# Patient Record
Sex: FEMALE | Race: WHITE | NOT HISPANIC OR LATINO | Employment: OTHER | URBAN - METROPOLITAN AREA
[De-identification: names, ages, dates, MRNs, and addresses within clinical notes are randomized per-mention and may not be internally consistent; named-entity substitution may affect disease eponyms.]

---

## 2023-04-27 ENCOUNTER — APPOINTMENT (OUTPATIENT)
Dept: GENERAL RADIOLOGY | Facility: CLINIC | Age: 43
End: 2023-04-27
Attending: FAMILY MEDICINE
Payer: COMMERCIAL

## 2023-04-27 ENCOUNTER — APPOINTMENT (OUTPATIENT)
Dept: CT IMAGING | Facility: CLINIC | Age: 43
End: 2023-04-27
Attending: FAMILY MEDICINE
Payer: COMMERCIAL

## 2023-04-27 ENCOUNTER — HOSPITAL ENCOUNTER (EMERGENCY)
Facility: CLINIC | Age: 43
Discharge: HOME | End: 2023-04-27
Attending: FAMILY MEDICINE | Admitting: FAMILY MEDICINE
Payer: COMMERCIAL

## 2023-04-27 VITALS
TEMPERATURE: 97.9 F | RESPIRATION RATE: 13 BRPM | OXYGEN SATURATION: 97 % | HEART RATE: 69 BPM | DIASTOLIC BLOOD PRESSURE: 81 MMHG | SYSTOLIC BLOOD PRESSURE: 121 MMHG

## 2023-04-27 DIAGNOSIS — R93.1 ABNORMAL CT SCAN OF HEART: ICD-10-CM

## 2023-04-27 DIAGNOSIS — R07.9 RIGHT-SIDED CHEST PAIN: ICD-10-CM

## 2023-04-27 DIAGNOSIS — J18.9 PNEUMONIA OF RIGHT LOWER LOBE DUE TO INFECTIOUS ORGANISM: ICD-10-CM

## 2023-04-27 LAB
ALBUMIN SERPL BCG-MCNC: 3.6 G/DL (ref 3.5–5.2)
ALP SERPL-CCNC: 85 U/L (ref 35–104)
ALT SERPL W P-5'-P-CCNC: 15 U/L (ref 10–35)
ANION GAP SERPL CALCULATED.3IONS-SCNC: 10 MMOL/L (ref 7–15)
APTT PPP: 29 SECONDS (ref 22–38)
AST SERPL W P-5'-P-CCNC: 14 U/L (ref 10–35)
BASOPHILS # BLD AUTO: 0.1 10E3/UL (ref 0–0.2)
BASOPHILS NFR BLD AUTO: 1 %
BILIRUB SERPL-MCNC: 0.2 MG/DL
BUN SERPL-MCNC: 6.5 MG/DL (ref 6–20)
CALCIUM SERPL-MCNC: 9.9 MG/DL (ref 8.6–10)
CHLORIDE SERPL-SCNC: 100 MMOL/L (ref 98–107)
CREAT SERPL-MCNC: 0.61 MG/DL (ref 0.51–0.95)
D DIMER PPP FEU-MCNC: 2.03 UG/ML FEU (ref 0–0.5)
DEPRECATED HCO3 PLAS-SCNC: 28 MMOL/L (ref 22–29)
EOSINOPHIL # BLD AUTO: 0.3 10E3/UL (ref 0–0.7)
EOSINOPHIL NFR BLD AUTO: 2 %
ERYTHROCYTE [DISTWIDTH] IN BLOOD BY AUTOMATED COUNT: 14.9 % (ref 10–15)
GFR SERPL CREATININE-BSD FRML MDRD: >90 ML/MIN/1.73M2
GLUCOSE SERPL-MCNC: 89 MG/DL (ref 70–99)
HCT VFR BLD AUTO: 39.6 % (ref 35–47)
HGB BLD-MCNC: 12.6 G/DL (ref 11.7–15.7)
HOLD SPECIMEN: NORMAL
IMM GRANULOCYTES # BLD: 0.1 10E3/UL
IMM GRANULOCYTES NFR BLD: 1 %
INR PPP: 0.99 (ref 0.85–1.15)
LYMPHOCYTES # BLD AUTO: 4.9 10E3/UL (ref 0.8–5.3)
LYMPHOCYTES NFR BLD AUTO: 30 %
MCH RBC QN AUTO: 28.6 PG (ref 26.5–33)
MCHC RBC AUTO-ENTMCNC: 31.8 G/DL (ref 31.5–36.5)
MCV RBC AUTO: 90 FL (ref 78–100)
MONOCYTES # BLD AUTO: 1.2 10E3/UL (ref 0–1.3)
MONOCYTES NFR BLD AUTO: 7 %
NEUTROPHILS # BLD AUTO: 9.7 10E3/UL (ref 1.6–8.3)
NEUTROPHILS NFR BLD AUTO: 59 %
NRBC # BLD AUTO: 0 10E3/UL
NRBC BLD AUTO-RTO: 0 /100
PLATELET # BLD AUTO: 587 10E3/UL (ref 150–450)
POTASSIUM SERPL-SCNC: 4.1 MMOL/L (ref 3.4–5.3)
PROT SERPL-MCNC: 7.2 G/DL (ref 6.4–8.3)
RBC # BLD AUTO: 4.4 10E6/UL (ref 3.8–5.2)
SODIUM SERPL-SCNC: 138 MMOL/L (ref 136–145)
TROPONIN T SERPL HS-MCNC: <6 NG/L
WBC # BLD AUTO: 16.3 10E3/UL (ref 4–11)

## 2023-04-27 PROCEDURE — 99285 EMERGENCY DEPT VISIT HI MDM: CPT | Mod: 25 | Performed by: FAMILY MEDICINE

## 2023-04-27 PROCEDURE — 250N000013 HC RX MED GY IP 250 OP 250 PS 637: Performed by: FAMILY MEDICINE

## 2023-04-27 PROCEDURE — 85025 COMPLETE CBC W/AUTO DIFF WBC: CPT | Performed by: FAMILY MEDICINE

## 2023-04-27 PROCEDURE — 99284 EMERGENCY DEPT VISIT MOD MDM: CPT | Mod: 25 | Performed by: FAMILY MEDICINE

## 2023-04-27 PROCEDURE — 71045 X-RAY EXAM CHEST 1 VIEW: CPT

## 2023-04-27 PROCEDURE — 93005 ELECTROCARDIOGRAM TRACING: CPT | Performed by: FAMILY MEDICINE

## 2023-04-27 PROCEDURE — 258N000003 HC RX IP 258 OP 636: Performed by: FAMILY MEDICINE

## 2023-04-27 PROCEDURE — 84484 ASSAY OF TROPONIN QUANT: CPT | Performed by: FAMILY MEDICINE

## 2023-04-27 PROCEDURE — 250N000011 HC RX IP 250 OP 636: Performed by: FAMILY MEDICINE

## 2023-04-27 PROCEDURE — 85730 THROMBOPLASTIN TIME PARTIAL: CPT | Performed by: FAMILY MEDICINE

## 2023-04-27 PROCEDURE — 36415 COLL VENOUS BLD VENIPUNCTURE: CPT | Performed by: FAMILY MEDICINE

## 2023-04-27 PROCEDURE — 250N000009 HC RX 250: Performed by: FAMILY MEDICINE

## 2023-04-27 PROCEDURE — 85610 PROTHROMBIN TIME: CPT | Performed by: FAMILY MEDICINE

## 2023-04-27 PROCEDURE — 96376 TX/PRO/DX INJ SAME DRUG ADON: CPT | Performed by: FAMILY MEDICINE

## 2023-04-27 PROCEDURE — 96374 THER/PROPH/DIAG INJ IV PUSH: CPT | Mod: 59 | Performed by: FAMILY MEDICINE

## 2023-04-27 PROCEDURE — 96361 HYDRATE IV INFUSION ADD-ON: CPT | Performed by: FAMILY MEDICINE

## 2023-04-27 PROCEDURE — 85379 FIBRIN DEGRADATION QUANT: CPT | Performed by: FAMILY MEDICINE

## 2023-04-27 PROCEDURE — 96375 TX/PRO/DX INJ NEW DRUG ADDON: CPT | Performed by: FAMILY MEDICINE

## 2023-04-27 PROCEDURE — 93010 ELECTROCARDIOGRAM REPORT: CPT | Performed by: FAMILY MEDICINE

## 2023-04-27 PROCEDURE — 71275 CT ANGIOGRAPHY CHEST: CPT

## 2023-04-27 PROCEDURE — 80053 COMPREHEN METABOLIC PANEL: CPT | Performed by: FAMILY MEDICINE

## 2023-04-27 RX ORDER — OXYCODONE HYDROCHLORIDE 5 MG/1
10 TABLET ORAL ONCE
Status: COMPLETED | OUTPATIENT
Start: 2023-04-27 | End: 2023-04-27

## 2023-04-27 RX ORDER — AZITHROMYCIN 250 MG/1
TABLET, FILM COATED ORAL
Qty: 6 TABLET | Refills: 0 | Status: SHIPPED | OUTPATIENT
Start: 2023-04-27

## 2023-04-27 RX ORDER — OXYCODONE HYDROCHLORIDE 5 MG/1
5 TABLET ORAL EVERY 6 HOURS PRN
Qty: 6 TABLET | Refills: 0 | Status: SHIPPED | OUTPATIENT
Start: 2023-04-27

## 2023-04-27 RX ORDER — HYDROMORPHONE HYDROCHLORIDE 1 MG/ML
0.5 INJECTION, SOLUTION INTRAMUSCULAR; INTRAVENOUS; SUBCUTANEOUS ONCE
Status: COMPLETED | OUTPATIENT
Start: 2023-04-27 | End: 2023-04-27

## 2023-04-27 RX ORDER — IOPAMIDOL 755 MG/ML
500 INJECTION, SOLUTION INTRAVASCULAR ONCE
Status: COMPLETED | OUTPATIENT
Start: 2023-04-27 | End: 2023-04-27

## 2023-04-27 RX ORDER — HYDROMORPHONE HYDROCHLORIDE 1 MG/ML
0.5 INJECTION, SOLUTION INTRAMUSCULAR; INTRAVENOUS; SUBCUTANEOUS
Status: COMPLETED | OUTPATIENT
Start: 2023-04-27 | End: 2023-04-27

## 2023-04-27 RX ORDER — KETOROLAC TROMETHAMINE 30 MG/ML
30 INJECTION, SOLUTION INTRAMUSCULAR; INTRAVENOUS ONCE
Status: COMPLETED | OUTPATIENT
Start: 2023-04-27 | End: 2023-04-27

## 2023-04-27 RX ADMIN — OXYCODONE HYDROCHLORIDE 10 MG: 5 TABLET ORAL at 23:00

## 2023-04-27 RX ADMIN — SODIUM CHLORIDE 70 ML: 9 INJECTION, SOLUTION INTRAVENOUS at 21:12

## 2023-04-27 RX ADMIN — IOPAMIDOL 85 ML: 755 INJECTION, SOLUTION INTRAVENOUS at 21:12

## 2023-04-27 RX ADMIN — SODIUM CHLORIDE 1000 ML: 9 INJECTION, SOLUTION INTRAVENOUS at 20:27

## 2023-04-27 RX ADMIN — KETOROLAC TROMETHAMINE 30 MG: 30 INJECTION, SOLUTION INTRAMUSCULAR at 20:24

## 2023-04-27 RX ADMIN — HYDROMORPHONE HYDROCHLORIDE 0.5 MG: 1 INJECTION, SOLUTION INTRAMUSCULAR; INTRAVENOUS; SUBCUTANEOUS at 21:52

## 2023-04-27 RX ADMIN — HYDROMORPHONE HYDROCHLORIDE 0.5 MG: 1 INJECTION, SOLUTION INTRAMUSCULAR; INTRAVENOUS; SUBCUTANEOUS at 20:24

## 2023-04-27 ASSESSMENT — ACTIVITIES OF DAILY LIVING (ADL)
ADLS_ACUITY_SCORE: 35
ADLS_ACUITY_SCORE: 35

## 2023-04-28 NOTE — ED TRIAGE NOTES
Pt presents with shortness of breath a\nd right sided chest pain. Pt states she was being treated for pneumonia on right side. Pt tachpnic  and appears in distress. Color gray.    Triage Assessment     Row Name 04/27/23 1956       Triage Assessment (Adult)    Airway WDL WDL       Respiratory WDL    Respiratory WDL X  Severe shortness of breath.       Skin Circulation/Temperature WDL    Skin Circulation/Temperature WDL X  Pale       Cardiac WDL    Cardiac WDL X;chest pain  Left side       Peripheral/Neurovascular WDL    Peripheral Neurovascular WDL WDL       Cognitive/Neuro/Behavioral WDL    Cognitive/Neuro/Behavioral WDL WDL

## 2023-04-28 NOTE — ED PROVIDER NOTES
[facility] ED Provider Note   Patient: Adam Figueroa  MRN #:  [id number]  Date of Visit: April 27, 2023    CC:     Chief Complaint   Patient presents with     Shortness of Breath     HPI:  Adam Figueroa is a 42 year old female with past medical history of reactive airway disease who presented to the emergency department with recent diagnosis of right-sided pneumonia, presenting with right-sided lateral chest pain, worse with deep breathing.  Patient states that she was at [facility], and diagnosed with pneumonia.  She completed 7 days of doxycycline.  She is experiencing pain that is exacerbated by deep breathing.  She denies any recent fever or chills although she had low-grade fever at the onset of her illness 7 to 10 days ago.  She has not had any nausea, vomiting, calf pain, ankle swelling.  No prior history of DVT or PE.  Patient quit smoking last June.  She has a [id number] history.  Patient typically goes to [facility] but states that she waited 10 hours last time and her daughter insisted that she come here.    Problem List:  There are no problems to display for this patient.      Past medical history: Depression, abnormal uterine bleeding, degenerative disc disease, kidney stone, obesity, seasonal affective disorder, anxiety, fibromyalgia, history of tobacco abuse    MEDS: azithromycin (ZITHROMAX) 250 MG tablet  oxyCODONE (ROXICODONE) 5 MG tablet        ALLERGIES:    Allergies   Allergen Reactions     Latex        Past surgical history: Tubal ligation, anterior discectomy, hemorrhoidectomy, carpal tunnel release           Review of Systems   Except as noted in HPI, all other systems were reviewed and are negative    Physical Exam     Vitals were reviewed  Patient Vitals for the past 12 hrs:   BP Temp Temp src Pulse Resp SpO2   04/27/23 2300 121/81 -- -- 69 13 97 %   04/27/23 2245 (!) 107/91 -- -- 72 16  95 %   04/27/23 2230 126/78 -- -- 58 22 91 %   04/27/23 2215 124/70 -- -- 57 12 95 %   04/27/23 2200 126/74 -- -- 67 26 94 %   04/27/23 2145 127/77 -- -- 63 15 97 %   04/27/23 2100 129/64 -- -- 71 11 95 %   04/27/23 2045 (!) 140/75 -- -- 98 (!) 43 98 %   04/27/23 2030 (!) 128/92 -- -- 75 13 95 %   04/27/23 2015 115/80 -- -- 65 25 97 %   04/27/23 2000 (!) 130/110 -- -- 82 27 97 %   04/27/23 1953 (!) 147/108 97.9  F (36.6  C) Oral 88 28 96 %     GENERAL APPEARANCE: Alert and oriented x3, severe distress due to pain  FACE: normal facies  EYES: Pupils are equal  HENT: normal external exam  NECK: no adenopathy or asymmetry  RESP: normal respiratory effort; clear breath sounds bilaterally  CV: regular rate and rhythm; no significant murmurs, gallops or rubs  ABD: soft, no tenderness; no rebound or guarding; bowel sounds are normal  MS: no gross deformities noted; normal muscle tone.  EXT: No calf tenderness or pitting edema  SKIN: no worrisome rash  NEURO: no facial droop; no focal deficits, speech is normal  PSYCH: normal mood and affect      Available Lab/Imaging Results     Results for orders placed or performed during the hospital encounter of 04/27/23 (from the past 24 hour(s))   [place] Draw    Narrative    The following orders were created for panel order [place] Draw.  Procedure                               Abnormality         Status                     ---------                               -----------         ------                     Extra Blue Top Tube[[id number]]                              Final result               Extra Green Top (Lithium...[[id number]]                      Final result               Extra Purple Top Tube[[id number]]                            Final result               Extra Green Top (Lithium...[[id number]]                      Final result                 Please view results for these tests on the individual orders.   Extra Blue Top Tube   Result Value Ref Range    Hold Specimen JI     Extra Green Top (Lithium Heparin) Tube   Result Value Ref Range    Hold Specimen JIC    Extra Purple Top Tube   Result Value Ref Range    Hold Specimen JIC    Extra Green Top (Lithium Heparin) ON ICE   Result Value Ref Range    Hold Specimen k    CBC with platelets differential    Narrative    The following orders were created for panel order CBC with platelets differential.  Procedure                               Abnormality         Status                     ---------                               -----------         ------                     CBC with platelets and d...[[id number]]  Abnormal            Final result                 Please view results for these tests on the individual orders.   Comprehensive metabolic panel   Result Value Ref Range    Sodium 138 136 - 145 mmol/L    Potassium 4.1 3.4 - 5.3 mmol/L    Chloride 100 98 - 107 mmol/L    Carbon Dioxide (CO2) 28 22 - 29 mmol/L    Anion Gap 10 7 - 15 mmol/L    Urea Nitrogen 6.5 6.0 - 20.0 mg/dL    Creatinine 0.61 0.51 - 0.95 mg/dL    Calcium 9.9 8.6 - 10.0 mg/dL    Glucose 89 70 - 99 mg/dL    Alkaline Phosphatase 85 35 - 104 U/L    AST 14 10 - 35 U/L    ALT 15 10 - 35 U/L    Protein Total 7.2 6.4 - 8.3 g/dL    Albumin 3.6 3.5 - 5.2 g/dL    Bilirubin Total 0.2 <=1.2 mg/dL    GFR Estimate >90 >60 mL/min/1.73m2   Troponin T, High Sensitivity   Result Value Ref Range    Troponin T, High Sensitivity <6 <=14 ng/L   INR   Result Value Ref Range    INR 0.99 0.85 - 1.15   Partial thromboplastin time   Result Value Ref Range    aPTT 29 22 - 38 Seconds   D dimer quantitative   Result Value Ref Range    D-Dimer Quantitative 2.03 (H) 0.00 - 0.50 ug/mL FEU    Narrative    This D-dimer assay is intended for use in conjunction with a clinical pretest probability assessment model to exclude pulmonary embolism (PE) and deep venous thrombosis (DVT) in outpatients suspected of PE or DVT. The cut-off value is 0.50 ug/mL FEU.   CBC with platelets and differential   Result  Value Ref Range    WBC Count 16.3 (H) 4.0 - 11.0 10e3/uL    RBC Count 4.40 3.80 - 5.20 10e6/uL    Hemoglobin 12.6 11.7 - 15.7 g/dL    Hematocrit 39.6 35.0 - 47.0 %    MCV 90 78 - 100 fL    MCH 28.6 26.5 - 33.0 pg    MCHC 31.8 31.5 - 36.5 g/dL    RDW 14.9 10.0 - 15.0 %    Platelet Count 587 (H) 150 - 450 10e3/uL    % Neutrophils 59 %    % Lymphocytes 30 %    % Monocytes 7 %    % Eosinophils 2 %    % Basophils 1 %    % Immature Granulocytes 1 %    NRBCs per 100 WBC 0 <1 /100    Absolute Neutrophils 9.7 (H) 1.6 - 8.3 10e3/uL    Absolute Lymphocytes 4.9 0.8 - 5.3 10e3/uL    Absolute Monocytes 1.2 0.0 - 1.3 10e3/uL    Absolute Eosinophils 0.3 0.0 - 0.7 10e3/uL    Absolute Basophils 0.1 0.0 - 0.2 10e3/uL    Absolute Immature Granulocytes 0.1 <=0.4 10e3/uL    Absolute NRBCs 0.0 10e3/uL   XR Chest Port 1 View    Narrative    EXAM: XR CHEST PORT 1 VIEW  LOCATION: [facility]  DATE/TIME: 4/27/2023 8:14 PM CDT    INDICATION: Chest pain, shortness of breath  COMPARISON: None.      Impression    IMPRESSION: Low lung volumes. Heart size prominent on this AP view. Pulmonary vascularity normal. Subsegmental atelectasis right lower lung field and probable small right pleural fluid. The left lung is clear. Lower cervical surgical changes.   CT Chest Pulmonary Embolism w Contrast    Narrative    EXAM: CT CHEST PULMONARY EMBOLISM W CONTRAST  LOCATION: [facility]  DATE/TIME: 4/27/2023 9:23 PM CDT    INDICATION: Severe right pleuritic chest pain.  Recent multifocal pneumonia; elevated D dimer.  Negative PE study 8 days ago.  COMPARISON: None.  TECHNIQUE: CT chest pulmonary angiogram during arterial phase injection of IV contrast. Multiplanar reformats and MIP reconstructions were performed. Dose reduction techniques were used.   CONTRAST: Isovue 370, 85 mL    FINDINGS:  ANGIOGRAM CHEST: Pulmonary arteries are normal caliber and negative for pulmonary emboli. Thoracic aorta is  negative for dissection.    LUNGS AND PLEURA: Alveolar groundglass opacity is present in the right lung relatively sparing the medial segment right middle lobe. Bands of atelectasis are present along the major fissure and near the costal pleura in the bases. Trace right pleural   effusion.    MEDIASTINUM: Dilated left ventricle with thinning of the left ventricular apex. There is contrast mixing artifact towards the apex of the left ventricle and in addition low-attenuation along the endocardium at the apex suspect for intraventricular   thrombus. No pericardial effusion. Decompressed esophagus. No lymphadenopathy. Imaged thyroid gland is normal.    CORONARY ARTERY CALCIFICATION: None.    UPPER ABDOMEN: Benign cyst arising from the upper pole left kidney does not require specific workup or follow-up.    MUSCULOSKELETAL: Normal.      Impression    IMPRESSION:    1.  No acute pulmonary embolism.  2.  Morphologically abnormal left ventricle with dilated apex, mixing artifact and possible left ventricular apical thrombus. Findings are suspect for a cardiomyopathy. Correlation with echocardiography or cardiac MRI is suggested.  3.  Patchy alveolar groundglass attenuation in the right lung consistent with inflammation and discrete bands of atelectasis towards the right base as well as minimal left pleural fluid.       EKG reviewed by me: Normal sinus rhythm with heart rate of 70.  Normal MA, QT and QRS intervals.  Poor R wave progression with possible anterior infarct, age undetermined.  No acute ST-T wave changes.  No previous EKGs for comparison.         Impression     Final diagnoses:   Right-sided chest pain   Pneumonia of right lower lobe due to infectious organism   Abnormal CT scan of heart         ED Course & Medical Decision Making   Adam Figueroa is a 42 year old female who presented to the emergency department with severe right-sided chest pain, with pleuritic quality.  Patient was seen at triage, and  room due to her pain, and poor color.  Patient is a patient through [facility], and was at [facility] on April 19 with diagnosis of pneumonia, and subsequently treated with doxycycline for 7 days.  Patient has not smoked for about a year, and prior to that she had smoked for about 20 years.  She presents to the ED with several days of severe right-sided chest pain, with pleuritic quality.  She has not had any fevers but pain was severe.  Her daughter did not want to bring her back to [facility] and instead brought her here.  Vital signs reveal a blood pressure of 147/108, heart rate of 88, respiratory rate of 28 with 96% oxygen saturation.  Exam reveals a 42-year-old female in severe distress due to pain.  Her lungs were clear.  No rash over the right thoracic region.  No calf pain or ankle swelling.  Heart sounds were normal.  Laboratory work-up reveals a white blood count of 16.3, with 59% neutrophils, hemoglobin of 12.6.  Comprehensive metabolic panel was normal.  Troponin is less than 6, and D-dimer is 2.03.  I reviewed the patient's [facility] note.  She had a CT scan with contrast and there was no evidence for PE on April 19.  A CT scan with contrast was repeated to ensure she has not had an interval development of a pulmonary embolus given the high D-dimer level today.  Patient received the following medications for pain.    Medications   ketorolac (TORADOL) injection 30 mg (30 mg Intravenous $Given 4/27/23 2024)   HYDROmorphone (PF) (DILAUDID) injection 0.5 mg (0.5 mg Intravenous $Given 4/27/23 2024)   0.9% sodium chloride BOLUS (0 mLs Intravenous Stopped 4/27/23 2141)   iopamidol (ISOVUE-370) solution 500 mL (85 mLs Intravenous $Given 4/27/23 2112)   sodium chloride 0.9 % bag 100mL for CT scan flush use (70 mLs Intravenous $Given 4/27/23 2112)   HYDROmorphone (PF) (DILAUDID) injection 0.5 mg (0.5 mg Intravenous $Given 4/27/23 2152)   oxyCODONE (ROXICODONE) tablet 10 mg (10 mg Oral $Given  4/27/23 2300)      Results of the CT scan reveals no acute pulmonary embolism.  There is patchy alveolar groundglass attenuation in the right lung consistent with inflammation and discrete bands of atelectasis towards the right base as well as minimal left pleural effusion.  Interestingly, patient has a morphologically abnormal left ventricle with dilated apex.  Mixing artifact and possible left ventricular apical thrombus.  Findings are suspect for a cardiomyopathy.  Correlation with echocardiography or cardiac MRI is suggested.    I reviewed the results of the patient's CT scan with her and her daughter.  Since she still has a productive cough, and what appears to be some pleuritic pain, she may have some residual pneumonia and I would like her to complete a 5-day course of Zithromax.  She will take oxycodone sparingly as needed for pain.  Patient was informed about her abnormal ventricle of the heart, and I placed a referral for a complete echocardiogram.  Patient usually goes to the [facility], and will also contact her primary care provider to see if she can get an expedited echocardiogram through [facility].  Patient was urged to return to the emergency department at any time if she develops new or worsening symptoms.  Her pain was much improved with Dilaudid and Toradol.  See discharge instructions below.        Written after-visit summary and instructions were given at the time of discharge.    Follow up Plan:   Екатерина Schreiber PA-C  [address]   [place] [address]  [phone]    Call in 1 day      [facility] Emergency Dept  [address] [place] [address] [phone]    If symptoms worsen      Discharge Instructions:   Your CT scan of the chest reveals some groundglass appearance and inflammation of the right lung with atelectasis.  With your elevated white blood count, and recent diagnosis of pneumonia, I would like you to take 5 more days  of Zithromax.  Reserve oxycodone for moderate to severe pain.  There may be a thrombus (blood clot) in the ventricle.  This needs to be followed up with an echocardiogram as soon as possible.  If we confirm that you have a blood clot you will need to be on blood thinners.  Please contact your primary care provider as soon as possible.  Return to the emergency department if you develop new or worsening symptoms.       Disclaimer: This note consists of words and symbols derived from keyboarding and dictation using voice recognition software.  As a result, there may be errors that have gone undetected.  Please consider this when interpreting information found in this note.       Ramon Gates MD  04/28/23 [id number]       Ramon Gates MD  05/01/23 [id number]

## 2023-04-28 NOTE — DISCHARGE INSTRUCTIONS
Your CT scan of the chest reveals some groundglass appearance and inflammation of the right lung with atelectasis.  With your elevated white blood count, and recent diagnosis of pneumonia, I would like you to take 5 more days of Zithromax.  Reserve oxycodone for moderate to severe pain.  There may be a thrombus (blood clot) in the ventricle.  This needs to be followed up with an echocardiogram as soon as possible.  If we confirm that you have a blood clot you will need to be on blood thinners.  Please contact your primary care provider as soon as possible.  Return to the emergency department if you develop new or worsening symptoms.